# Patient Record
Sex: FEMALE | Race: WHITE | NOT HISPANIC OR LATINO | Employment: OTHER | URBAN - METROPOLITAN AREA
[De-identification: names, ages, dates, MRNs, and addresses within clinical notes are randomized per-mention and may not be internally consistent; named-entity substitution may affect disease eponyms.]

---

## 2024-03-01 NOTE — PROGRESS NOTES
Chief Complaint: Second opinion on Mary Jane's Reversal      History Of Present Illness  Shavonne Taylor is a 74 y.o. female with history of diverticulitis who underwent an ultralow Mary Jane's procedure with end sigmoid colostomy on 11/12/23 with Dr. Seaman at [facility]. Per documentation, surgical findings included enlarged fibroid uterus and healthy rectum ~5-7 cm from anal verge. She is scheduled for Mary Jane's Reversal with concurrent hysterectomy and possible ileostomy creation in April at [facility]. She presents to clinic for 2nd opinion.     She is currently managing her colostomy well-- no issues with pouching, changing her bag 1-2 times daily, surrounding skin healthy, no dehydration. She would like to have the reversal, however is concerned that she will have poor quality of life due to fecal incontinence/ severe urgency. She is also concerned about surgical recovery. Of note, the barium enema showed 5-10cm rectum remaining.     Her prior bowel function is obscured by long-standing diverticulitis and prior immunosuppression, however in the remote past she did not endorse any chronic diarrhea or incontinence. The details of a Mary Jane's reversal were discussed at length, included possible postoperative expectations and options for colostomy re-creation if quality of life is inadequate.    Colonoscopy (to cecum) on 3/16/22 Impression:             - Ileitis. Biopsied.   - Internal hemorrhoid       Past Medical History  She has a past medical history of Left lower quadrant pain, Other malaise, Other specified noninflammatory disorders of uterus, Ovarian cyst, Personal history of diseases of the blood and blood-forming organs and certain disorders involving the immune mechanism, Personal history of diseases of the skin and subcutaneous tissue, Personal history of diseases of the skin and subcutaneous tissue, Personal history of other diseases of the digestive system, Personal history of other diseases of the digestive  "system, Personal history of other specified conditions (11/17/2015), and Rectal abscess.    Surgical History  She has a past surgical history that includes Other surgical history (10/07/2013) and Oophorectomy (10/07/2013).     Social History  Remote tobacco use, not since her 20s  Occasional EtOH, approx 1 drink/week  Medical THC (edible form)    Family History  No family history on file.     Allergies  Procaine, Aspirin, Erythromycin, Estrogens, Oxycodone, and Sulfa (sulfonamide antibiotics)    Home Medications  Prior to Admission medications    Not on File         Review of Systems   Constitutional:  Negative for fatigue, fever and unexpected weight change.   Eyes:  Negative for visual disturbance.   Respiratory:  Negative for cough and shortness of breath.    Cardiovascular:  Negative for chest pain and palpitations.   Gastrointestinal:  Negative for abdominal distention and abdominal pain.   Genitourinary:  Negative for difficulty urinating and hematuria.   Musculoskeletal:  Negative for arthralgias and myalgias.   Skin:  Negative for color change and rash.   Neurological:  Negative for dizziness, seizures and syncope.   Psychiatric/Behavioral:  Negative for agitation and confusion.          Imaging:  Barium Enema 2/29/24  RESULT:     : Large calcified uterine fibroid.  Left lower quadrant colostomy.   Pelvic enteric staple line.  No dilated loops of bowel.     Contrast opacifies a short rectal stump, measuring approximately 5-10 cm   in length.  No leak or obstruction.     Labs:   No results found for: \"LABBILI\", \"BILIDIR\", \"AST\", \"ALT\", \"ALKPHOS\", \"PROT\", \"ALBUMIN\", \"AMYLASE\", \"CEA\"     Physical Exam:  General: no acute distress  Neuro: alert and oriented  Head/Neck: normocephalic, atraumatic  ENT: moist mucous membranes  CV: regular rate and rhythm to palpation  Pulm: nonlabored breathing on room air, no conversational dyspnea  Abd: soft, nondistended, nontender, colostomy bag with gas and " "stool  Anorectal: deferred  MSK: AKBAR, no gross deformities  Psych: mood and behavior normal     Last Recorded Vitals  Blood pressure (!) 149/102, pulse 88, resp. rate 16, height 1.676 m (5' 6\"), weight 60.4 kg (133 lb 1.6 oz).      Assessment/Plan   Shavonne Taylor is a 74 year old female who is status post Mary Jane's procedure in Nov 2023 for diverticulitis. She is otherwise healthy (aplastic anemia in remission, not on immunosuppressants) and has 5-10cm healthy rectum remaining. If she requires hysterectomy concurrently with her reversal, DLI may be necessary. This was discussed with the patient. Otherwise, discussed with patient that her bowel function prior to her diverticulitis is reassuring (no prior incontinence), however the weighing of risks and benefits is ultimately her decision.    - Offered clinical expertise and opinion  - Patient will proceed with decision-making and schedule surgery (if desired) with either surgical team      Dr. Chris Milton   3/5/2024     "

## 2024-03-05 ENCOUNTER — OFFICE VISIT (OUTPATIENT)
Dept: SURGERY | Facility: CLINIC | Age: 74
End: 2024-03-05
Payer: MEDICARE

## 2024-03-05 VITALS
BODY MASS INDEX: 21.39 KG/M2 | HEART RATE: 88 BPM | WEIGHT: 133.1 LBS | SYSTOLIC BLOOD PRESSURE: 149 MMHG | RESPIRATION RATE: 16 BRPM | HEIGHT: 66 IN | DIASTOLIC BLOOD PRESSURE: 102 MMHG

## 2024-03-05 DIAGNOSIS — Z93.3 COLOSTOMY IN PLACE (MULTI): Primary | ICD-10-CM

## 2024-03-05 PROCEDURE — 99203 OFFICE O/P NEW LOW 30 MIN: CPT | Performed by: COLON & RECTAL SURGERY

## 2024-03-05 PROCEDURE — 1126F AMNT PAIN NOTED NONE PRSNT: CPT | Performed by: COLON & RECTAL SURGERY

## 2024-03-05 PROCEDURE — 1159F MED LIST DOCD IN RCRD: CPT | Performed by: COLON & RECTAL SURGERY

## 2024-03-05 RX ORDER — HYDROCHLOROTHIAZIDE 12.5 MG/1
1 CAPSULE ORAL EVERY MORNING
COMMUNITY
Start: 2024-02-16

## 2024-03-05 RX ORDER — OMEPRAZOLE 20 MG/1
20 CAPSULE, DELAYED RELEASE ORAL
COMMUNITY
Start: 2024-02-26

## 2024-03-05 RX ORDER — FOLIC ACID 0.4 MG
1 TABLET ORAL DAILY
COMMUNITY

## 2024-03-05 RX ORDER — CLOBETASOL PROPIONATE 0.5 MG/G
OINTMENT TOPICAL 2 TIMES DAILY
COMMUNITY
Start: 2023-03-01

## 2024-03-05 RX ORDER — CALCIUM CITRATE/VITAMIN D3 315MG-6.25
1 TABLET ORAL DAILY
COMMUNITY
Start: 2015-05-19

## 2024-03-05 RX ORDER — TRIAMCINOLONE ACETONIDE 0.25 MG/G
OINTMENT TOPICAL 2 TIMES DAILY
COMMUNITY
Start: 2023-10-27

## 2024-03-05 RX ORDER — POTASSIUM CHLORIDE 20 MEQ/1
20 TABLET, EXTENDED RELEASE ORAL DAILY
COMMUNITY
Start: 2023-12-14

## 2024-03-05 RX ORDER — AMLODIPINE BESYLATE 5 MG/1
5 TABLET ORAL DAILY
COMMUNITY
Start: 2024-02-16

## 2024-03-05 RX ORDER — BUSPIRONE HYDROCHLORIDE 5 MG/1
5 TABLET ORAL 3 TIMES DAILY
COMMUNITY
Start: 2024-02-22 | End: 2024-04-22

## 2024-03-05 RX ORDER — MECOBALAMIN 1000 MCG
TABLET,CHEWABLE ORAL
COMMUNITY
Start: 2021-11-17

## 2024-03-05 RX ORDER — LEVOTHYROXINE SODIUM 100 UG/1
100 TABLET ORAL
COMMUNITY
Start: 2013-10-28

## 2024-03-05 ASSESSMENT — ENCOUNTER SYMPTOMS
DIZZINESS: 0
MYALGIAS: 0
ABDOMINAL PAIN: 0
UNEXPECTED WEIGHT CHANGE: 0
HEMATURIA: 0
ARTHRALGIAS: 0
ABDOMINAL DISTENTION: 0
COLOR CHANGE: 0
SHORTNESS OF BREATH: 0
DIFFICULTY URINATING: 0
COUGH: 0
CONFUSION: 0
AGITATION: 0
PALPITATIONS: 0
FATIGUE: 0
SEIZURES: 0
FEVER: 0

## 2024-03-05 ASSESSMENT — PAIN SCALES - GENERAL: PAINLEVEL: 0-NO PAIN

## 2025-06-17 ENCOUNTER — DOCUMENTATION (OUTPATIENT)
Dept: GERIATRIC MEDICINE | Facility: CLINIC | Age: 75
End: 2025-06-17
Payer: MEDICARE

## 2025-06-17 NOTE — PROGRESS NOTES
Newly admitted to SNF 30mins ago. Started vomiting and having diarrhea on arrival in facility. Patient also feel numbness on L arm where daptomycin was administered. Dapto was just started this morning. Patient told staff that she wanted to be sent out due to vomiting and diarrhea. Will be sent out.